# Patient Record
Sex: MALE | Race: WHITE | NOT HISPANIC OR LATINO | Employment: FULL TIME | URBAN - METROPOLITAN AREA
[De-identification: names, ages, dates, MRNs, and addresses within clinical notes are randomized per-mention and may not be internally consistent; named-entity substitution may affect disease eponyms.]

---

## 2021-05-10 ENCOUNTER — APPOINTMENT (OUTPATIENT)
Dept: RADIOLOGY | Facility: CLINIC | Age: 51
End: 2021-05-10
Payer: OTHER MISCELLANEOUS

## 2021-05-10 ENCOUNTER — APPOINTMENT (OUTPATIENT)
Dept: URGENT CARE | Facility: CLINIC | Age: 51
End: 2021-05-10
Payer: OTHER MISCELLANEOUS

## 2021-05-10 DIAGNOSIS — M25.532 LEFT WRIST PAIN: ICD-10-CM

## 2021-05-10 DIAGNOSIS — M79.671 RIGHT FOOT PAIN: ICD-10-CM

## 2021-05-10 DIAGNOSIS — M79.671 RIGHT FOOT PAIN: Primary | ICD-10-CM

## 2021-05-10 PROCEDURE — 73110 X-RAY EXAM OF WRIST: CPT

## 2021-05-10 PROCEDURE — 99283 EMERGENCY DEPT VISIT LOW MDM: CPT | Performed by: EMERGENCY MEDICINE

## 2021-05-10 PROCEDURE — 73610 X-RAY EXAM OF ANKLE: CPT

## 2021-05-10 PROCEDURE — G0382 LEV 3 HOSP TYPE B ED VISIT: HCPCS | Performed by: EMERGENCY MEDICINE

## 2021-05-21 VITALS
HEIGHT: 72 IN | HEART RATE: 80 BPM | TEMPERATURE: 97.6 F | SYSTOLIC BLOOD PRESSURE: 130 MMHG | BODY MASS INDEX: 40.63 KG/M2 | WEIGHT: 300 LBS | DIASTOLIC BLOOD PRESSURE: 86 MMHG

## 2021-05-21 DIAGNOSIS — S93.401A SPRAIN OF UNSPECIFIED LIGAMENT OF RIGHT ANKLE, INITIAL ENCOUNTER: Primary | ICD-10-CM

## 2021-05-21 PROCEDURE — 99204 OFFICE O/P NEW MOD 45 MIN: CPT | Performed by: ORTHOPAEDIC SURGERY

## 2021-05-21 NOTE — LETTER
May 21, 2021     Patient: Richelle Neville   YOB: 1970   Date of Visit: 5/21/2021       To Whom it May Concern:    Richelle Neville is under my professional care  He was seen in my office on 5/21/2021  He may return to work on 5/21/21 with restrictions  Intermittent walking, standing, sitting  Must be permitted to wear brace or walker cast boot during work hours  Not permitted to drive with walker cast boot on RLE  If you have any questions or concerns, please don't hesitate to call           Sincerely,          Barber Kohler        CC: No Recipients

## 2021-05-21 NOTE — LETTER
May 21, 2021     Patient: Jose Aguilera   YOB: 1970   Date of Visit: 5/21/2021       To Whom it May Concern:    Jose Aguilera is under my professional care  He was seen in my office on 5/21/2021  He may return to work on 05/21/2021  He is not permitted to drive  He is permitted to bear weight on the right lower extremity as tolerated but should wear a walker cast boot or ankle brace  If you have any questions or concerns, please don't hesitate to call           Sincerely,          Justus Epley        CC: No Recipients

## 2021-05-21 NOTE — PROGRESS NOTES
ASSESSMENT/PLAN:    Diagnoses and all orders for this visit:    Sprain of unspecified ligament of right ankle, initial encounter  -     Brace  -     Ambulatory referral to Physical Therapy; Future          X-rays performed in urgent care were reviewed with the patient  Treatment options were discussed  He was offered a referral to physical therapy which he accepted  He was also provided an ankle brace which he can transition to whenever he feels comfortable  He was encouraged to use OTC analgesics and ice as needed for pain control  He was advised that ankle sprains can sometimes take several weeks or more to fully heal   He was provided a note for work with restrictions  We will see him back in 10-14 days for recheck  He was encouraged to contact the office should questions or concerns arise  Return for 10-14 days  _____________________________________________________  CHIEF COMPLAINT:  Chief Complaint   Patient presents with    Right Ankle - Pain    Left Wrist - Pain         SUBJECTIVE:  Cristobal Yoo is a 48 y o  male who presents   For evaluation of right ankle pain  The patient reports on 05/10/2021 he was working  When he rolled his ankle in a pothole  The patient works as a  and was in the process of unloading of vehicle  He presented to Urgent Care shortly after the injury  He was evaluated for both his right ankle and left wrist as he braced for his fall on his right upper extremity  Since the injuries, he reports that his left wrist pain has completely resolved  He has return to his normal activities with the left upper extremity  He was provided a walker cast boot in urgent care for his right lower extremity  Since then, he has noted continued pain  He denies any significant swelling  He denies   Numbness or tingling    He does admit to a prior ankle sprain about 3 years ago in the right ankle but denies any fractures or surgeries and all previous ankle symptoms had resolved  He states that he was able to bear some weight after the injury but noted increased pain by the time he was able to get to the urgent care  PAST MEDICAL HISTORY:  Past Medical History:   Diagnosis Date    Anxiety     Hypertension        PAST SURGICAL HISTORY:  Past Surgical History:   Procedure Laterality Date    ELEVATION OF DEPRESSED SKULL FRACTURE         FAMILY HISTORY:  Family History   Problem Relation Age of Onset    Diabetes Mother     Dementia Mother     Hypertension Father        SOCIAL HISTORY:  Social History     Tobacco Use    Smoking status: Never Smoker    Smokeless tobacco: Current User     Types: Snuff   Substance Use Topics    Alcohol use: Never     Frequency: Never    Drug use: Never       MEDICATIONS:  No current outpatient medications on file  ALLERGIES:  No Known Allergies    Review of systems:   Constitutional: Negative for fatigue, fever or loss of apetite  HENT: Negative  Respiratory: Negative for shortness of breath, dyspnea  Cardiovascular: Negative for chest pain/tightness  Gastrointestinal: Negative for abdominal pain, N/V  Endocrine: Negative for cold/heat intolerance, unexplained weight loss/gain  Genitourinary: Negative for flank pain, dysuria, hematuria  Musculoskeletal:   Positive as stated in the HPI  Skin: Negative for rash  Neurological:   Negative for numbness and tingling  Psychiatric/Behavioral: Negative for agitation  _____________________________________________________  PHYSICAL EXAMINATION:    Blood pressure 130/86, pulse 80, temperature 97 6 °F (36 4 °C), temperature source Temporal, height 6' (1 829 m), weight 136 kg (300 lb)      General: well developed and well nourished, alert, oriented times 3 and appears comfortable  Psychiatric: Normal  HEENT: Benign  Cardiovascular: Regular    Pulmonary: No wheezing or stridor  Abdomen: Soft, Nontender  Skin: No masses, erythema, lacerations, fluctation, ulcerations  Neurovascular: Motor and sensory exams are grossly intact, distal pulses are palpable  Limb is warm and well perfused good color and capillary refill the toes  MUSCULOSKELETAL EXAMINATION:      The right ankle exam demonstrates the walker cast boot to be in place upon arrival   This was removed without difficulty  Skin is intact, no erythema, no ecchymosis, no significant swelling  He has tenderness to palpation over the ATF and CF ligaments  He has limited range of motion secondary to pain  Syndesmotic squeeze elicits no complaints  Thigh and calf compartments are soft and compressible without pain  Talar tilt and anterior drawer tests are stable  The remainder of the right lower extremity exam is benign  _____________________________________________________  STUDIES REVIEWED:  I have personally reviewed pertinent films and reports in PACS  X-rays of the left wrist performed in occupation medicine demonstrate no acute osseous abnormalities, no significant degenerative changes  X-rays of the right ankle performed on the same day demonstrate no acute osseous abnormalities  The reports were reviewed          Sonia Freeman

## 2021-09-29 ENCOUNTER — OFFICE VISIT (OUTPATIENT)
Dept: URGENT CARE | Facility: CLINIC | Age: 51
End: 2021-09-29
Payer: COMMERCIAL

## 2021-09-29 ENCOUNTER — APPOINTMENT (OUTPATIENT)
Dept: RADIOLOGY | Facility: CLINIC | Age: 51
End: 2021-09-29
Payer: COMMERCIAL

## 2021-09-29 VITALS
TEMPERATURE: 97.5 F | BODY MASS INDEX: 40.63 KG/M2 | HEART RATE: 79 BPM | RESPIRATION RATE: 16 BRPM | HEIGHT: 72 IN | DIASTOLIC BLOOD PRESSURE: 91 MMHG | OXYGEN SATURATION: 96 % | SYSTOLIC BLOOD PRESSURE: 142 MMHG | WEIGHT: 300 LBS

## 2021-09-29 DIAGNOSIS — S89.92XA INJURY OF LEFT KNEE, INITIAL ENCOUNTER: Primary | ICD-10-CM

## 2021-09-29 DIAGNOSIS — S89.92XA INJURY OF LEFT KNEE, INITIAL ENCOUNTER: ICD-10-CM

## 2021-09-29 DIAGNOSIS — M62.838 NECK MUSCLE SPASM: ICD-10-CM

## 2021-09-29 DIAGNOSIS — M54.6 ACUTE MIDLINE THORACIC BACK PAIN: ICD-10-CM

## 2021-09-29 PROBLEM — M17.12 LOCALIZED OSTEOARTHRITIS OF LEFT KNEE: Status: ACTIVE | Noted: 2021-09-29

## 2021-09-29 PROCEDURE — 73564 X-RAY EXAM KNEE 4 OR MORE: CPT

## 2021-09-29 PROCEDURE — G0382 LEV 3 HOSP TYPE B ED VISIT: HCPCS | Performed by: NURSE PRACTITIONER

## 2021-09-29 RX ORDER — IBUPROFEN 800 MG/1
800 TABLET ORAL 3 TIMES DAILY PRN
Qty: 60 TABLET | Refills: 0 | Status: SHIPPED | OUTPATIENT
Start: 2021-09-29

## 2021-09-29 RX ORDER — LISINOPRIL 10 MG/1
10 TABLET ORAL DAILY
COMMUNITY

## 2021-09-29 RX ORDER — METHOCARBAMOL 500 MG/1
500 TABLET, FILM COATED ORAL 3 TIMES DAILY PRN
Qty: 30 TABLET | Refills: 0 | Status: SHIPPED | OUTPATIENT
Start: 2021-09-29

## 2021-09-29 RX ORDER — LEVOTHYROXINE SODIUM 0.03 MG/1
25 TABLET ORAL DAILY
COMMUNITY
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
